# Patient Record
Sex: FEMALE | Race: WHITE | NOT HISPANIC OR LATINO | Employment: FULL TIME | URBAN - METROPOLITAN AREA
[De-identification: names, ages, dates, MRNs, and addresses within clinical notes are randomized per-mention and may not be internally consistent; named-entity substitution may affect disease eponyms.]

---

## 2024-06-03 ENCOUNTER — HOSPITAL ENCOUNTER (OUTPATIENT)
Dept: RADIOLOGY | Facility: HOSPITAL | Age: 54
Discharge: HOME | End: 2024-06-03
Attending: NURSE PRACTITIONER
Payer: COMMERCIAL

## 2024-06-03 DIAGNOSIS — Z12.31 ENCOUNTER FOR SCREENING MAMMOGRAM FOR BREAST CANCER: ICD-10-CM

## 2024-06-26 ENCOUNTER — HOSPITAL ENCOUNTER (OUTPATIENT)
Dept: RADIOLOGY | Facility: HOSPITAL | Age: 54
Discharge: HOME | End: 2024-06-26
Attending: NURSE PRACTITIONER
Payer: COMMERCIAL

## 2024-06-26 DIAGNOSIS — R92.2 INCONCLUSIVE MAMMOGRAM: ICD-10-CM

## 2024-06-26 PROCEDURE — 77065 DX MAMMO INCL CAD UNI: CPT | Mod: 26,LT,, | Performed by: RADIOLOGY

## 2024-06-26 PROCEDURE — 76642 ULTRASOUND BREAST LIMITED: CPT | Mod: 26,LT,, | Performed by: RADIOLOGY

## 2024-06-26 PROCEDURE — 76642 ULTRASOUND BREAST LIMITED: CPT | Mod: TC,LT

## 2024-06-26 PROCEDURE — 77061 BREAST TOMOSYNTHESIS UNI: CPT | Mod: 26,LT,, | Performed by: RADIOLOGY

## 2024-06-26 PROCEDURE — 77061 BREAST TOMOSYNTHESIS UNI: CPT | Mod: TC,LT

## 2024-07-09 ENCOUNTER — HOSPITAL ENCOUNTER (OUTPATIENT)
Dept: RADIOLOGY | Facility: HOSPITAL | Age: 54
Discharge: HOME | End: 2024-07-09
Attending: NURSE PRACTITIONER
Payer: COMMERCIAL

## 2024-07-09 DIAGNOSIS — N63.22 MASS OF UPPER INNER QUADRANT OF LEFT BREAST: ICD-10-CM

## 2024-07-09 PROCEDURE — 88305 TISSUE EXAM BY PATHOLOGIST: CPT | Performed by: PATHOLOGY

## 2024-07-09 PROCEDURE — 77061 BREAST TOMOSYNTHESIS UNI: CPT | Mod: 26,LT,, | Performed by: RADIOLOGY

## 2024-07-09 PROCEDURE — 88342 IMHCHEM/IMCYTCHM 1ST ANTB: CPT | Performed by: PATHOLOGY

## 2024-07-09 PROCEDURE — 27200937 US BREAST BIOPSY WITH IMAGING 1ST SITE LEFT

## 2024-07-09 PROCEDURE — 88360 TUMOR IMMUNOHISTOCHEM/MANUAL: CPT | Mod: 59 | Performed by: PATHOLOGY

## 2024-07-09 PROCEDURE — 19083 BX BREAST 1ST LESION US IMAG: CPT | Mod: LT

## 2024-07-09 PROCEDURE — 27200940 US BREAST BIOPSY WITH IMAGING 1ST SITE LEFT

## 2024-07-09 PROCEDURE — 77065 DX MAMMO INCL CAD UNI: CPT | Mod: 26,LT,, | Performed by: RADIOLOGY

## 2024-07-09 PROCEDURE — 19083 BX BREAST 1ST LESION US IMAG: CPT | Mod: LT,,, | Performed by: RADIOLOGY

## 2024-07-09 PROCEDURE — 77061 BREAST TOMOSYNTHESIS UNI: CPT | Mod: TC,LT

## 2024-07-09 PROCEDURE — A4648 IMPLANTABLE TISSUE MARKER: HCPCS

## 2024-07-11 LAB
COMMENT: ABNORMAL
FINAL PATHOLOGIC DIAGNOSIS: ABNORMAL
GROSS: ABNORMAL
Lab: ABNORMAL
SUPPLEMENTAL DIAGNOSIS: ABNORMAL

## 2025-04-16 ENCOUNTER — HOSPITAL ENCOUNTER (OUTPATIENT)
Dept: RADIOLOGY | Facility: HOSPITAL | Age: 55
Discharge: HOME | End: 2025-04-16
Attending: NURSE PRACTITIONER
Payer: COMMERCIAL

## 2025-04-16 DIAGNOSIS — D72.819 LEUCOPENIA: ICD-10-CM

## 2025-04-16 DIAGNOSIS — R74.01 NONSPECIFIC ELEVATION OF LEVELS OF TRANSAMINASE OR LACTIC ACID DEHYDROGENASE (LDH): ICD-10-CM

## 2025-04-16 DIAGNOSIS — R10.11 ABDOMINAL PAIN, RIGHT UPPER QUADRANT: ICD-10-CM

## 2025-04-16 DIAGNOSIS — R74.02 NONSPECIFIC ELEVATION OF LEVELS OF TRANSAMINASE OR LACTIC ACID DEHYDROGENASE (LDH): ICD-10-CM

## 2025-04-16 PROCEDURE — 76700 US EXAM ABDOM COMPLETE: CPT | Mod: TC

## 2025-04-16 PROCEDURE — 76700 US EXAM ABDOM COMPLETE: CPT | Mod: 26,,, | Performed by: RADIOLOGY

## 2025-05-06 ENCOUNTER — OFFICE VISIT (OUTPATIENT)
Dept: PODIATRY | Facility: CLINIC | Age: 55
End: 2025-05-06
Payer: COMMERCIAL

## 2025-05-06 VITALS
HEIGHT: 64 IN | WEIGHT: 175 LBS | DIASTOLIC BLOOD PRESSURE: 90 MMHG | RESPIRATION RATE: 18 BRPM | BODY MASS INDEX: 29.88 KG/M2 | HEART RATE: 66 BPM | SYSTOLIC BLOOD PRESSURE: 152 MMHG

## 2025-05-06 DIAGNOSIS — M79.672 INFLAMMATORY HEEL PAIN, LEFT: ICD-10-CM

## 2025-05-06 DIAGNOSIS — M79.672 CHRONIC FOOT PAIN, LEFT: ICD-10-CM

## 2025-05-06 DIAGNOSIS — G89.29 CHRONIC FOOT PAIN, LEFT: ICD-10-CM

## 2025-05-06 DIAGNOSIS — M72.2 PLANTAR FASCIITIS, LEFT: Primary | ICD-10-CM

## 2025-05-06 PROCEDURE — 3077F SYST BP >= 140 MM HG: CPT | Mod: CPTII,S$GLB,, | Performed by: PODIATRIST

## 2025-05-06 PROCEDURE — 1159F MED LIST DOCD IN RCRD: CPT | Mod: CPTII,S$GLB,, | Performed by: PODIATRIST

## 2025-05-06 PROCEDURE — 99203 OFFICE O/P NEW LOW 30 MIN: CPT | Mod: S$GLB,,, | Performed by: PODIATRIST

## 2025-05-06 PROCEDURE — 1160F RVW MEDS BY RX/DR IN RCRD: CPT | Mod: CPTII,S$GLB,, | Performed by: PODIATRIST

## 2025-05-06 PROCEDURE — 3080F DIAST BP >= 90 MM HG: CPT | Mod: CPTII,S$GLB,, | Performed by: PODIATRIST

## 2025-05-06 PROCEDURE — 3008F BODY MASS INDEX DOCD: CPT | Mod: CPTII,S$GLB,, | Performed by: PODIATRIST

## 2025-05-06 PROCEDURE — 99999 PR PBB SHADOW E&M-EST. PATIENT-LVL III: CPT | Mod: PBBFAC,,, | Performed by: PODIATRIST

## 2025-05-06 RX ORDER — PANTOPRAZOLE SODIUM 40 MG/1
40 TABLET, DELAYED RELEASE ORAL
COMMUNITY
Start: 2024-07-16 | End: 2025-05-09

## 2025-05-06 RX ORDER — CETIRIZINE HYDROCHLORIDE 10 MG/1
10 TABLET ORAL
COMMUNITY
Start: 2024-08-21 | End: 2025-05-09

## 2025-05-06 RX ORDER — TIZANIDINE 4 MG/1
4 TABLET ORAL NIGHTLY PRN
COMMUNITY
Start: 2025-02-19 | End: 2025-05-09

## 2025-05-06 RX ORDER — ATENOLOL 50 MG/1
50 TABLET ORAL
COMMUNITY

## 2025-05-06 RX ORDER — SCOPOLAMINE 1 MG/3D
PATCH, EXTENDED RELEASE TRANSDERMAL
COMMUNITY
Start: 2025-02-13 | End: 2025-05-09

## 2025-05-06 RX ORDER — AMITRIPTYLINE HYDROCHLORIDE 10 MG/1
10 TABLET, FILM COATED ORAL
COMMUNITY
Start: 2024-07-16 | End: 2025-05-09

## 2025-05-06 RX ORDER — VIT C/E/ZN/COPPR/LUTEIN/ZEAXAN 250MG-90MG
10 CAPSULE ORAL
COMMUNITY
Start: 2024-08-21 | End: 2025-05-09

## 2025-05-06 RX ORDER — FLUTICASONE PROPIONATE 50 MCG
50 SPRAY, SUSPENSION (ML) NASAL
COMMUNITY
Start: 2024-08-21 | End: 2025-05-09

## 2025-05-06 RX ORDER — DIPHENHYDRAMINE HCL 25 MG
25 CAPSULE ORAL
COMMUNITY
Start: 2024-08-21 | End: 2025-05-09

## 2025-05-06 RX ORDER — LETROZOLE 2.5 MG/1
TABLET, FILM COATED ORAL
COMMUNITY
Start: 2025-02-20

## 2025-05-06 RX ORDER — TAMOXIFEN CITRATE 10 MG/1
10 TABLET ORAL
COMMUNITY
Start: 2024-12-19 | End: 2025-05-09

## 2025-05-06 RX ORDER — VALACYCLOVIR HYDROCHLORIDE 1 G/1
TABLET, FILM COATED ORAL
COMMUNITY
Start: 2025-02-13 | End: 2025-05-09

## 2025-05-07 ENCOUNTER — PATIENT MESSAGE (OUTPATIENT)
Dept: PODIATRY | Facility: CLINIC | Age: 55
End: 2025-05-07
Payer: COMMERCIAL

## 2025-05-07 NOTE — PROGRESS NOTES
"Subjective:       Patient ID: Claudia Silva is a 54 y.o. female.    Chief Complaint: Foot Pain, Heel Pain, and Ankle Pain  Patient presents with complaint of left heel pain for about 6 months.  Has had problems in the past, resolved without complication but this is not resolving.  Has been wearing up Step inserts, applying ice, stretching.  Pain is worse upon 1st steps and the end of the day.  Confirms wearing good tennis shoes with her insoles, avoiding barefoot since it is more painful.  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    BREAST SURGERY      HYSTERECTOMY      MASTECTOMY Bilateral 08/27/2024    ROBOTIC HYSTERECTOMY, WITH SALPINGO-OOPHORECTOMY       Family History   Problem Relation Name Age of Onset    Breast cancer Maternal Grandmother  65     Social History     Socioeconomic History    Marital status:    Tobacco Use    Smoking status: Never    Smokeless tobacco: Never   Substance and Sexual Activity    Alcohol use: Not Currently    Drug use: Not Currently       Current Medications[1]  Review of patient's allergies indicates:  No Known Allergies    Review of Systems   All other systems reviewed and are negative.      Objective:      Vitals:    05/06/25 1529   BP: (!) 152/90   Pulse: 66   Resp: 18   Weight: 79.4 kg (175 lb)   Height: 5' 4.17" (1.63 m)     Physical Exam  Vitals and nursing note reviewed.   Constitutional:       General: She is not in acute distress.     Appearance: Normal appearance.   Cardiovascular:      Pulses:           Dorsalis pedis pulses are 2+ on the right side and 2+ on the left side.        Posterior tibial pulses are 2+ on the right side and 2+ on the left side.   Musculoskeletal:         General: Swelling and tenderness present. No signs of " injury.      Right foot: Bunion present.      Left foot: Bunion present.      Comments: Tense firm edematous right heel with pain at the plantar fascial insertion and medial tubercle of the calcaneus   Skin:     Capillary Refill: Capillary refill takes less than 2 seconds.      Findings: No bruising or erythema.   Neurological:      General: No focal deficit present.      Mental Status: She is alert.   Psychiatric:         Thought Content: Thought content normal.                    Assessment:       1. Plantar fasciitis, left    2. Chronic foot pain, left    3. Inflammatory heel pain, left        Plan:             Reviewed plantar fasciitis at length with patient.    Explained this is a strain/sprain of the supportive band through the arch on the bottom of the foot and needs to be supported properly along with treatment for inflammation.   Explained bracing of the plantar fascia through appropriate arch support is crucial.  Reviewed evaluating current insoles to a more firm arch support, example power step [employer]  Discussed appropriate tennis shoes/walking/running, thick sole for  shock absorption, reviewed other tennis shoe options with thicker sole for shock absorption which may be more effective     Advised arch support needs to be full length, firm, supportive and replaces existing insoles in tennis shoes.   Reviewed how to gradually adjust to wearing firm arch supports comfortably, always removing insoles first to accommodate supports.  Once comfortable should be worn at all times of weight-bearing until pain resolves, then any time for work if possible, excessive activity, exercise, prolonged walking or standing. Reviewed appropriate shoes for indoors, no flat shoes or walking barefoot.    Reviewed stretching in the morning, shoes at bedside  Stretch in the evening   Advised patient she need to add anti-inflammatory treatments  Reviewed ice/cool therapy and frequency this should be performed.    Reviewed  topical treatments to reduce inflammation, example Voltaren gel over-the-counter as directed    Reviewed oral anti-inflammatory /NSAID therapy with meals as directed no more than 2 weeks  Reviewed IM steroid/cortisone and IM Toradol injection due to chronicity.  Reviewed effectiveness in decreasing inflammation, potential side effects and length of time injection may be beneficial.  Topical treatments are to be utilized also for best results  Patient wished to try more conservative treatments  She has blood work coming up and did not want any of the injections considered today to interfere what those results  We discussed using topical treatments for inflammation along with the oral  If she is not having significant improvement in 2 weeks or not resolved in 4 weeks consider intramuscular injections  Patient was in understanding and agreement with treatment plan.  I counseled the patient on their conditions, implications and medical management.  Instructed patient to contact the office with any changes, questions, concerns, worsening of symptoms.   Total face to face time 30 minutes, exam, assessment, treatment, discussion, additional time for review of chart prior to and following appointment and visit documentation, consultation and coordination of care.    Follow up 2-4 weeks    This note was created using M*Modal voice recognition software that occasionally misinterpreted phrases or words.         [1]   Current Outpatient Medications   Medication Sig Dispense Refill    atenoloL (TENORMIN) 50 MG tablet Take 50 mg by mouth.      letrozole (FEMARA) 2.5 mg Tab        No current facility-administered medications for this visit.